# Patient Record
Sex: MALE | Race: WHITE
[De-identification: names, ages, dates, MRNs, and addresses within clinical notes are randomized per-mention and may not be internally consistent; named-entity substitution may affect disease eponyms.]

---

## 2018-09-25 ENCOUNTER — HOSPITAL ENCOUNTER (OUTPATIENT)
Dept: HOSPITAL 92 - LABBT | Age: 48
Discharge: HOME | End: 2018-09-25
Attending: ORTHOPAEDIC SURGERY
Payer: COMMERCIAL

## 2018-09-25 DIAGNOSIS — S83.241A: ICD-10-CM

## 2018-09-25 DIAGNOSIS — Z01.812: Primary | ICD-10-CM

## 2018-09-25 LAB
HGB BLD-MCNC: 15.1 G/DL (ref 14–18)
MCH RBC QN AUTO: 29.6 PG (ref 27–31)
MCV RBC AUTO: 89.8 FL (ref 78–98)
PLATELET # BLD AUTO: 287 THOU/UL (ref 130–400)
RBC # BLD AUTO: 5.12 MILL/UL (ref 4.7–6.1)
WBC # BLD AUTO: 13.3 THOU/UL (ref 4.8–10.8)

## 2018-09-25 PROCEDURE — 85027 COMPLETE CBC AUTOMATED: CPT

## 2018-09-27 ENCOUNTER — HOSPITAL ENCOUNTER (OUTPATIENT)
Dept: HOSPITAL 92 - SDC | Age: 48
Discharge: HOME | End: 2018-09-27
Attending: ORTHOPAEDIC SURGERY
Payer: COMMERCIAL

## 2018-09-27 VITALS — BODY MASS INDEX: 43.2 KG/M2

## 2018-09-27 DIAGNOSIS — Z79.891: ICD-10-CM
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
## 2018-09-27 DIAGNOSIS — S83.241A: Primary | ICD-10-CM

## 2018-09-27 PROCEDURE — 0SBC4ZZ EXCISION OF RIGHT KNEE JOINT, PERCUTANEOUS ENDOSCOPIC APPROACH: ICD-10-PCS | Performed by: ORTHOPAEDIC SURGERY

## 2018-09-27 PROCEDURE — 96374 THER/PROPH/DIAG INJ IV PUSH: CPT

## 2018-09-27 NOTE — OP
DATE OF PROCEDURE:  09/27/2018

 

PREOPERATIVE DIAGNOSES:  Right medial meniscus tear with a loose body MCL 
sprain.

 

POSTOPERATIVE DIAGNOSES:  Right medial meniscus tear, radial component with no 
loose body found, no cartilage loose body found or donor site noted.  
Crystalline deposition within the knee.

 

STAFF:  Jason Bertrand M.D.

 

ASSISTANT:  None.

 

ANESTHESIA:  DR Gilliland.  The patient received LMA with 30 mL of Marcaine 
preprocedure and 30 mL of lidocaine post procedure intraarticular. 

 

ANTIBIOTICS:  Ancef 2 grams.

 

IMPLANTS:  None.

 

COMPLICATIONS:  None.

 

HISTORY OF PRESENT ILLNESS:  Mr. Arthur is a pleasant 48-year-old male who 
jumped over a small dog gate, hurt his knee on the 18th.  Range of motion is 
worse.  The patient had MRI ordered, had 7-8/10 medial knee pain and some 
locking symptoms.  The patient has been utilizing hinged knee brace.  MRI 
showed a posteromedial meniscus tear, Baker's cyst and what appeared to be an 
intra-articular loose body.  The patient had understood the risks and benefits 
and arthroscopic evaluation for this part of excision medial meniscus repair.  
The patient understood the risks of this procedure to include pain, scar, 
bleeding, infection, damage to vital structures, decreased range of motion or 
strength, continued pain despite surgical intervention, loss of life or limb, 
arthritis.  The patient understood these risks and benefits and elected to 
proceed.

 

PROCEDURE IN DETAIL:  Time out was performed designating the patient's right 
lower extremity as the operative site based on site, consents, markings.  After 
completion of the timeout the patient's right lower extremity was prepped and 
draped in sterile fashion.  Tourniquet was brought up and left up for a total 
of 29 minutes.  Anterolateral and lateral working portal were placed.  The 
patient had a fat pad excised.  There was some synovitis that was noted 
throughout the tissue, just red and inflamed tissue and some crystal disease, I 
do not believe that I injected the patient before of if he had had any 
injections before this surgery.  I looked in the pouch as well as in the 
gutters for loose body.  I looked throughout the joint.  I could not palpate or 
the deliver a loose body or find a lesion would have led to the chondral loose 
body.  The patient had a clean lateral meniscus.  The patient had a small 
little radial tear in his medial meniscus which I was able to evaluate with my 
probe and debrided back to stable remnant.  The patient's meniscus was stable 
at that point.  I then went back and looked again throughout the joint for a 
loose body which I was not able to see or recover.  After completion of my 
meniscectomy I then washed and closed with 3-0 nylon.  I let the tourniquet 
down after 29 minutes, injected Marcaine in the joint before I started my scope 
about 10 minutes before I started my scope injected Marcaine before I started 
with epinephrine, then afterwards I just injected lidocaine.

 

The patient will be weightbearing as tolerated.  I will see him back in clinic 
in about 2 weeks.  The patient's outlook is guarded.

 

RITO